# Patient Record
Sex: FEMALE | Race: OTHER | Employment: FULL TIME | URBAN - NONMETROPOLITAN AREA
[De-identification: names, ages, dates, MRNs, and addresses within clinical notes are randomized per-mention and may not be internally consistent; named-entity substitution may affect disease eponyms.]

---
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
## 2020-09-23 ENCOUNTER — OFFICE VISIT (OUTPATIENT)
Dept: PRIMARY CARE CLINIC | Age: 20
End: 2020-09-23

## 2020-09-23 VITALS
HEART RATE: 93 BPM | SYSTOLIC BLOOD PRESSURE: 119 MMHG | WEIGHT: 132 LBS | RESPIRATION RATE: 18 BRPM | BODY MASS INDEX: 24.29 KG/M2 | TEMPERATURE: 97.8 F | DIASTOLIC BLOOD PRESSURE: 82 MMHG | HEIGHT: 62 IN

## 2020-09-23 PROCEDURE — 99213 OFFICE O/P EST LOW 20 MIN: CPT | Performed by: NURSE PRACTITIONER

## 2020-09-23 RX ORDER — TRIAMCINOLONE ACETONIDE 1 MG/G
CREAM TOPICAL
Qty: 30 G | Refills: 0 | Status: SHIPPED | OUTPATIENT
Start: 2020-09-23

## 2020-09-23 ASSESSMENT — ENCOUNTER SYMPTOMS
SHORTNESS OF BREATH: 0
SORE THROAT: 0
RHINORRHEA: 0
NAUSEA: 0
WHEEZING: 0
VOMITING: 0
DIARRHEA: 0
COUGH: 0

## 2020-09-23 NOTE — PROGRESS NOTES
[address] [facility]  [address] [address]  Dept: [phone]  Dept Fax: [phone]     Lynn Foremna is a 21 y.o. female who presents to the [facility] in Care today for hermedical conditions/complaints as noted below. Lynn Foreman is c/o of Rash (Pt c/o rash on arms and back x 3 days.)      HPI:     Rash   This is a new problem. The current episode started in the past 7 days (Started 3 days ago with rash to back, abdomen and B/L arms. ). The problem is unchanged. The affected locations include the abdomen, back, right arm and left arm. The rash is characterized by redness and itchiness (Reports one of the spots is itchy and others are not. ). It is unknown (Denies using any new soaps, detergents, lotions or medications. Denies plant contact. Denies ill contacts.) if there was an exposure to a precipitant. Pertinent negatives include no congestion, cough, diarrhea, fatigue, fever, rhinorrhea, shortness of breath, sore throat or vomiting. Past treatments include nothing. The treatment provided no relief. There is no history of allergies, asthma, eczema or varicella. History reviewed. No pertinent past medical history. Current Outpatient Medications   Medication Sig Dispense Refill    triamcinolone (KENALOG) 0.1 % cream Apply topically 2 times daily for 7-10 days. No more than 2 weeks. 30 g 0     No current facility-administered medications for this visit. Allergies   Allergen Reactions    Motrin [Ibuprofen]      Lips become red and swollen       Subjective:     Review of Systems   Constitutional: Negative for appetite change, chills, fatigue and fever. HENT: Negative for congestion, rhinorrhea and sore throat. Respiratory: Negative for cough, shortness of breath and wheezing. Gastrointestinal: Negative for diarrhea, nausea and vomiting. Skin: Positive for rash. Negative for wound.    Neurological: Negative for dizziness and headaches. Objective:      Physical Exam  Vitals signs and nursing note reviewed. Constitutional:       General: She is not in acute distress. Appearance: Normal appearance. She is well-developed. She is not ill-appearing or diaphoretic. Comments: Well hydrated, nontoxic appearance. HENT:      Head: Normocephalic and atraumatic. Right Ear: Hearing and external ear normal.      Left Ear: Hearing and external ear normal.      Nose: Nose normal.   Eyes:      Conjunctiva/sclera: Conjunctivae normal.   Cardiovascular:      Rate and Rhythm: Normal rate and regular rhythm. Heart sounds: Normal heart sounds, S1 normal and S2 normal. No murmur. No friction rub. No gallop. Pulmonary:      Effort: Pulmonary effort is normal. No accessory muscle usage or respiratory distress. Breath sounds: Normal breath sounds and air entry. No decreased breath sounds, wheezing, rhonchi or rales. Comments: No cough. Breath sounds clear B/L anterior and posterior lobes. Chest expansion symmetrical.  No audible wheezing or respiratory distress. No rales or rhonchi. Chest:      Chest wall: No tenderness. Musculoskeletal: Normal range of motion. Lymphadenopathy:      Cervical: No cervical adenopathy. Right cervical: No superficial or posterior cervical adenopathy. Left cervical: No superficial or posterior cervical adenopathy. Upper Body:      Right upper body: No pectoral adenopathy. Left upper body: No pectoral adenopathy. Skin:     General: Skin is warm and dry. Coloration: Skin is not pale. Findings: Rash present. No abrasion, bruising, ecchymosis, erythema, lesion or petechiae. Rash is papular and urticarial. Rash is not crusting, macular, nodular, purpuric, pustular, scaling or vesicular.       Comments: Scattered erythematous, urticarial, papular rash with isolated spots, one on right forearm, 2 on left upper arm, few on abdomen and majority on back.,  No drainage, bleeding, seeping, crusting or red streaking. No focal warmth, edema or tenderness. Neurological:      Mental Status: She is alert and oriented to person, place, and time. Psychiatric:         Behavior: Behavior normal. Behavior is cooperative. /82 (Site: Right Upper Arm, Position: Sitting, Cuff Size: Medium Adult)   Pulse 93   Temp 97.8 °F (36.6 °C) (Temporal)   Resp 18   Ht 5' 2\" (1.575 m)   Wt 132 lb (59.9 kg)   LMP 08/25/2020   BMI 24.14 kg/m²           Assessment:      Diagnosis Orders   1. Allergic contact dermatitis, unspecified trigger  triamcinolone (KENALOG) 0.1 % cream       Plan:      Return if symptoms worsen or fail to improve. Orders Placed This Encounter   Medications    triamcinolone (KENALOG) 0.1 % cream     Sig: Apply topically 2 times daily for 7-10 days. No more than 2 weeks. Dispense:  30 g     Refill:  0      · Apply thin layer of Triamcinolone 0.1% cream to affected areas, twice a day for 7-10 days or until lesions have cleared, no longer than 2 weeks. Wash hands after applying cream.  · Cetirizine 10 mg tablet by mouth once a day for itching. · Oatmeal baths or cool compresses to soothe itching. · Identify and avoid further exposure to allergens. · Avoid scratching which may increase inflammation. · Keep fingernails clean and trimmed. · Patient instructions given for Contact Dermatitis and Triamcinolone cream.  · Follow up with PCP immediately if symptoms worsen or signs of secondary infection   develop, such as fever, increasing redness, warmth, purulent drainage and/or increasing pain. · Follow up with PCP in 3-4 days for re-evaluation of dermatitis requiring topical or oral corticosteroid use. · To ER or call 911 if any difficulty breathing, shortness of breath, inability to swallow, hives, facial/tongue swelling or temp greater than 103 degrees. Melody Sanchez received counseling on the following healthy behaviors: medication adherence. Patient given educational materials - see patient instructions. Discussed use,benefit, and side effects of prescribed medications. Treatment plan discussed at visit. Continue routine health care follow up. All patient questions answered. Pt voiced understanding.       Electronically signed by ROSMERY Hogan CNP on 9/23/2020 at 1:40 PM

## 2020-09-23 NOTE — PATIENT INSTRUCTIONS
Patient Education        Dermatitis: Care Instructions  Your Care Instructions  Dermatitis is the general name used for any rash or inflammation of the skin. Different kinds of dermatitis cause different kinds of rashes. Common causes of a rash include new medicines, plants (such as poison oak or poison ivy), heat, and stress. Certain illnesses can also cause a rash. An allergic reaction to something that touches your skin, such as latex, nickel, or poison ivy, is called contact dermatitis. Contact dermatitis may also be caused by something that irritates the skin, such as bleach, a chemical, or soap. These types of rashes cannot be spread from person to person. How long your rash will last depends on what caused it. Rashes may last a few days or months. Follow-up care is a key part of your treatment and safety. Be sure to make and go to all appointments, and call your doctor if you are having problems. It's also a good idea to know your test results and keep a list of the medicines you take. How can you care for yourself at home? · Do not scratch the rash. Cut your nails short, and file them smooth. Or wear gloves if this helps keep you from scratching. · Wash the area with water only. Pat dry. · Put cold, wet cloths on the rash to reduce itching. · Keep cool, and stay out of the sun. · Leave the rash open to the air as much as possible. · If the rash itches, use hydrocortisone cream. Follow the directions on the label. Calamine lotion may help for plant rashes. · Take an over-the-counter antihistamine, such as diphenhydramine (Benadryl) or loratadine (Claritin), to help calm the itching. Read and follow all instructions on the label. · If your doctor prescribed a cream, use it as directed. If your doctor prescribed medicine, take it exactly as directed. When should you call for help?    Call your doctor now or seek immediate medical care if:  · You have symptoms of infection, such as:  ? Increased pain, swelling, warmth, or redness. ? Red streaks leading from the area. ? Pus draining from the area. ? A fever. · You have joint pain along with the rash. Watch closely for changes in your health, and be sure to contact your doctor if:  · Your rash is changing or getting worse. · You are not getting better as expected. Where can you learn more? Go to https://[employer]. org and sign in to your [employer] account. Enter [phone] in the [facility] box to learn more about \"Dermatitis: Care Instructions. \"     If you do not have an account, please click on the \"Sign Up Now\" link. Current as of: October 31, 2019               Content Version: 12.5  © 3616-7108 [employer], Incorporated. Care instructions adapted under license by [facility] ([facility]). If you have questions about a medical condition or this instruction, always ask your healthcare professional. [place] [address] any warranty or liability for your use of this information. Patient Education        triamcinolone topical  Pronunciation:  Fabien Gibson am SIN oh lone  Brand:  DermasilkRx SDS Jevon, Dermasorb TA, DermaWerx SDS Jevon, Jeff, Pedralva, [place], Triderm  What is the most important information I should know about triamcinolone topical?  Follow all directions on your medicine label and package. Tell each of your healthcare providers about all your medical conditions, allergies, and all medicines you use. What is triamcinolone topical?  Triamcinolone is a potent steroid that helps reduce inflammation in the body. Triamcinolone topical (for the skin) is used to treat the inflammation and itching caused by skin conditions that respond to steroid medication. The dental paste form of triamcinolone topical is used to treat mouth ulcers. Triamcinolone topical may also be used for purposes not listed in this medication guide.   What should I discuss with my healthcare provider before using triamcinolone topical?  You should not use triamcinolone if you are allergic to it. Tell your doctor if you have ever had:  · any type of skin infection;  · a skin reaction to any steroid medicine;  · liver disease; or  · an adrenal gland disorder. Steroid medicines can increase the glucose (sugar) levels in your blood or urine. Tell your doctor if you have diabetes. Tell your doctor if you are pregnant or breastfeeding. If you apply triamcinolone to your chest, avoid areas that may come into contact with the nursing baby's mouth. How should I use triamcinolone topical?  Follow all directions on your prescription label and read all medication guides or instruction sheets. Use the medicine exactly as directed. Triamcinolone topical cream, lotion, ointment, or spray is for use only on the skin. Triamcinolone dental paste is applied directly onto an ulcer inside the mouth and left in place. Do not swallow this medicine. Wash your hands before and after using triamcinolone topical, unless you are using this medicine to treat the skin on your hands. Apply a thin layer of medicine to the affected skin. Do not apply this medicine over a large area of skin unless your doctor has told you to. Do not cover the treated skin area with a bandage or other covering unless your doctor tells you to. Covering treated areas can increase the amount of medicine absorbed through your skin and may cause harmful effects. If you are treating the diaper area, do not use plastic pants or tight-fitting diapers. To use the dental paste, press a small dab onto the mouth ulcer but do not rub in the medicine. The dab will form a thin film that should be left in place for several hours. Triamcinolone dental paste is usually applied at bedtime and/or after meals. Follow your doctor's instructions. Call your doctor if your symptoms do not improve, or if they get worse. A mouth ulcer should improve within 1 week of using triamcinolone dental paste.   You should stop using this medicine once your symptoms are controlled. Store at room temperature away from moisture and heat. What happens if I miss a dose? Apply the medicine as soon as you can, but skip the missed dose if it is almost time for your next dose. Do not apply two doses at one time. What happens if I overdose? Seek emergency medical attention or call the Poison Help line at 1-784.185.4434 if anyone has accidentally swallowed the medication. High doses or long-term use of steroid medicine can lead to thinning skin, easy bruising, changes in body fat (especially in your face, neck, back, and waist), increased acne or facial hair, menstrual problems, impotence, or loss of interest in sex. What should I avoid while using triamcinolone topical?  Avoid getting this medicine in your eyes. Avoid using other topical steroid medications on the areas you treat with triamcinolone unless your doctor tells you to. Do not use triamcinolone topical to treat any condition that has not been checked by your doctor. Do not share this medicine with another person, even if they have the same symptoms you have. What are the possible side effects of triamcinolone topical?  Get emergency medical help if you have signs of an allergic reaction: hives; difficult breathing; swelling of your face, lips, tongue, or throat.   Call your doctor at once if you have:  · worsening of your skin condition;  · redness, warmth, swelling, oozing, or severe irritation of any treated skin;  · blurred vision, tunnel vision, eye pain, or seeing halos around lights;  · high blood sugar --increased thirst, increased urination, dry mouth, fruity breath odor; or  · possible signs of absorbing this medicine through your skin or gums --weight gain (especially in your face or your upper back and torso), slow wound healing, thinning or discolored skin, increased body hair, muscle weakness, nausea, diarrhea, tiredness, mood changes, menstrual changes, sexual changes. Children can absorb larger amounts of this medicine through the skin and may be more likely to have side effects such as growth delay, headaches, or pain behind the eyes. A baby using this medicine may have a bulging soft spot (the top of the head where the skull hasn't yet grown together). Common side effects may include:  · burning, itching, dryness, or other irritation of treated skin;  · redness or crusting around your hair follicles;  · redness or itching around your mouth;  · allergic skin reaction;  · stretch marks;  · acne, increased body hair growth;  · thinning skin or discoloration; or  · white or \"pruned\" appearance of the skin (caused by covering treated skin with a tight bandage or other covering). This is not a complete list of side effects and others may occur. Call your doctor for medical advice about side effects. You may report side effects to FDA at 8-684-FDA-6288. What other drugs will affect triamcinolone topical?  Medicine used on the skin is not likely to be affected by other drugs you use. But many drugs can interact with each other. Tell each of your healthcare providers about all medicines you use, including prescription and over-the-counter medicines, vitamins, and herbal products. Where can I get more information? Your pharmacist can provide more information about triamcinolone topical.  Remember, keep this and all other medicines out of the reach of children, never share your medicines with others, and use this medication only for the indication prescribed. Every effort has been made to ensure that the information provided by [address] is accurate, up-to-date, and complete, but no guarantee is made to that effect. Drug information contained herein may be time sensitive.  [employer] information has been compiled for use by healthcare practitioners and consumers in the [place] and therefore [employer] does not warrant that uses outside of the [facility] immediately if symptoms worsen or signs of secondary infection   develop, such as fever, increasing redness, warmth, purulent drainage and/or increasing pain. · Follow up with PCP in 3-4 days for re-evaluation of dermatitis requiring topical or oral corticosteroid use. · To ER or call 911 if any difficulty breathing, shortness of breath, inability to swallow, hives, facial/tongue swelling or temp greater than 103 degrees.